# Patient Record
Sex: MALE | Race: OTHER | NOT HISPANIC OR LATINO
[De-identification: names, ages, dates, MRNs, and addresses within clinical notes are randomized per-mention and may not be internally consistent; named-entity substitution may affect disease eponyms.]

---

## 2017-08-11 ENCOUNTER — TRANSCRIPTION ENCOUNTER (OUTPATIENT)
Age: 42
End: 2017-08-11

## 2019-10-03 ENCOUNTER — OUTPATIENT (OUTPATIENT)
Dept: OUTPATIENT SERVICES | Facility: HOSPITAL | Age: 44
LOS: 1 days | Discharge: HOME | End: 2019-10-03
Payer: MEDICAID

## 2019-10-03 DIAGNOSIS — R06.00 DYSPNEA, UNSPECIFIED: ICD-10-CM

## 2019-10-03 DIAGNOSIS — R05 COUGH: ICD-10-CM

## 2019-10-03 PROCEDURE — 71046 X-RAY EXAM CHEST 2 VIEWS: CPT | Mod: 26

## 2021-08-31 ENCOUNTER — TRANSCRIPTION ENCOUNTER (OUTPATIENT)
Age: 46
End: 2021-08-31

## 2022-06-22 ENCOUNTER — EMERGENCY (EMERGENCY)
Facility: HOSPITAL | Age: 47
LOS: 0 days | Discharge: LEFT BEFORE DISCHARGE | End: 2022-06-22
Attending: EMERGENCY MEDICINE | Admitting: EMERGENCY MEDICINE
Payer: MEDICAID

## 2022-06-22 VITALS
WEIGHT: 210.1 LBS | SYSTOLIC BLOOD PRESSURE: 115 MMHG | HEART RATE: 67 BPM | TEMPERATURE: 98 F | OXYGEN SATURATION: 98 % | DIASTOLIC BLOOD PRESSURE: 64 MMHG | RESPIRATION RATE: 20 BRPM

## 2022-06-22 DIAGNOSIS — R07.9 CHEST PAIN, UNSPECIFIED: ICD-10-CM

## 2022-06-22 DIAGNOSIS — Z53.21 PROCEDURE AND TREATMENT NOT CARRIED OUT DUE TO PATIENT LEAVING PRIOR TO BEING SEEN BY HEALTH CARE PROVIDER: ICD-10-CM

## 2022-06-22 PROCEDURE — L9991: CPT

## 2022-06-22 PROCEDURE — 93010 ELECTROCARDIOGRAM REPORT: CPT

## 2022-06-22 NOTE — ED ADULT NURSE NOTE - EXPLANATION OF PATIENT'S REASON FOR LEAVING
Pt was being aggressive and RN had to call security. Pt walked out of ER without being seen by a provider.

## 2022-06-22 NOTE — ED ADULT NURSE REASSESSMENT NOTE - NS ED NURSE REASSESS COMMENT FT1
Per Charge nurse got a call from [facility]'s charge nurse named Anusha reported that pt is at their ER and they had removed the IV that was placed on his right hand.

## 2022-06-22 NOTE — ED ADULT NURSE REASSESSMENT NOTE - NS ED NURSE REASSESS COMMENT FT1
Pt is A&Ox4 came for chest pain. Nurse ale placed IV line on right hand and collected blood. PT is pending to be seen by provider. Primary rn came to assess pt and he was very agitated and verbal aggressive to nurse. Nurse told him to "calm down and provider will come to see him soon." Pt was very angry and hit his hand on the wall. RN had to call security for safety reason. PT walked out with an IV on and refused to allow staffs to remove it. RN called the [phone] to report about pt walked out with IV line. Report given to precinct  [facility]. Charge nurse and [facility] made aware.

## 2022-11-01 ENCOUNTER — NON-APPOINTMENT (OUTPATIENT)
Age: 47
End: 2022-11-01

## 2024-07-31 ENCOUNTER — APPOINTMENT (OUTPATIENT)
Dept: ORTHOPEDIC SURGERY | Facility: CLINIC | Age: 49
End: 2024-07-31
Payer: MEDICAID

## 2024-07-31 VITALS — HEIGHT: 74 IN | WEIGHT: 204 LBS | BODY MASS INDEX: 26.18 KG/M2

## 2024-07-31 DIAGNOSIS — S50.01XA CONTUSION OF RIGHT ELBOW, INITIAL ENCOUNTER: ICD-10-CM

## 2024-07-31 DIAGNOSIS — I25.2 OLD MYOCARDIAL INFARCTION: ICD-10-CM

## 2024-07-31 DIAGNOSIS — M20.011 MALLET FINGER OF RIGHT FINGER(S): ICD-10-CM

## 2024-07-31 PROCEDURE — 99203 OFFICE O/P NEW LOW 30 MIN: CPT | Mod: 25

## 2024-07-31 PROCEDURE — 73030 X-RAY EXAM OF SHOULDER: CPT | Mod: LT

## 2024-07-31 PROCEDURE — 73140 X-RAY EXAM OF FINGER(S): CPT | Mod: RT

## 2024-07-31 PROCEDURE — 73080 X-RAY EXAM OF ELBOW: CPT | Mod: 50

## 2024-07-31 RX ORDER — HYDROCODONE BITARTRATE AND ACETAMINOPHEN 5; 325 MG/1; MG/1
5-325 TABLET ORAL EVERY 8 HOURS
Qty: 21 | Refills: 0 | Status: ACTIVE | COMMUNITY
Start: 2024-07-31 | End: 1900-01-01

## 2024-08-01 ENCOUNTER — APPOINTMENT (OUTPATIENT)
Dept: MRI IMAGING | Facility: CLINIC | Age: 49
End: 2024-08-01
Payer: MEDICAID

## 2024-08-01 PROCEDURE — 73221 MRI JOINT UPR EXTREM W/O DYE: CPT | Mod: LT

## 2024-08-01 NOTE — HISTORY OF PRESENT ILLNESS
[de-identified] : 48-year-old male here for an evaluation of injury sustained to the left upper extremity, right elbow and right pinky. Patient states that on July 30, 2024, he was playing football with his family and he fell down injuring his left upper extremity and his right elbow and right pinky.  Patient admits past medical history of heart attack in 2022. Patient denies any allergy to medication. Patient states that he took Tylenol and ibuprofen with no improvement of symptoms

## 2024-08-01 NOTE — IMAGING
[de-identified] : Examination of the right hand, patient has some swelling over the right pinky, there is an extension lag, tenderness over the dorsal aspect of the DIP.  Good range of motion about the PIP and MCP. Neurovascular intact.  X-ray of the right pinky (3 views) was done in office today, no acute fracture or dislocation noted.  Examination of the right elbow, no swelling, no ecchymosis, no erythema, tender when palpating over the right elbow. No signs of instability. Painful range of motion to the elbow. Neurovascular intact.  X-ray of the right elbow (3 views) was done in office today, negative for any acute fracture or dislocation.  Examination of the left upper extremity, patient has generalized swelling, patient has abnormality over the biceps muscle. Patient has severe pain, swelling, large ecchymosis and bogginess over the distal biceps. Patient has exquisite tenderness over the proximal biceps. Patient has significant decreased range of motion to the left shoulder and left elbow. Large area of ecchymosis over the medial aspect of the elbow.  Neurovascular intact.  Radiographs of the right elbow, 3 views were taken, negative for any acute fracture or dislocation.

## 2024-08-01 NOTE — DISCUSSION/SUMMARY
[de-identified] : Impression: Contusion to the right elbow, mallet finger to the right pinky, injury to the biceps tendon distally to the left elbow.  Plan: Patient was advised for a stat MRI to the left elbow to evaluate a ruptured tear to the distal biceps. Patient was advised for compression and the use of the sling for comfort. Patient was advised to apply ice. Patient will continue with ibuprofen and Tylenol, Percocet was sent for breakthrough pain. Patient was advised for a follow-up with our sports surgeons in 1 week for possible surgical consult  With regards to the right pinky, patient was advised of a mildly finger, patient was advised to use a stack splint, instruction how to use it was given. Patient was advised to return to the office in 3 weeks for repeat evaluation for the pinky  Follow-up: 1 week with Dr. Marilee Kay. 3 weeks with our hand PAs

## 2024-08-06 PROBLEM — S49.92XA INJURY OF LEFT SHOULDER, INITIAL ENCOUNTER: Status: ACTIVE | Noted: 2024-08-01

## 2024-08-08 ENCOUNTER — APPOINTMENT (OUTPATIENT)
Dept: ORTHOPEDIC SURGERY | Facility: CLINIC | Age: 49
End: 2024-08-08

## 2024-08-08 PROBLEM — S46.212A RUPTURE OF LEFT BICEPS TENDON, INITIAL ENCOUNTER: Status: ACTIVE | Noted: 2024-08-01

## 2024-08-22 ENCOUNTER — APPOINTMENT (OUTPATIENT)
Dept: ORTHOPEDIC SURGERY | Facility: CLINIC | Age: 49
End: 2024-08-22